# Patient Record
Sex: FEMALE | Race: WHITE | NOT HISPANIC OR LATINO | Employment: FULL TIME | URBAN - METROPOLITAN AREA
[De-identification: names, ages, dates, MRNs, and addresses within clinical notes are randomized per-mention and may not be internally consistent; named-entity substitution may affect disease eponyms.]

---

## 2021-06-28 ENCOUNTER — OFFICE VISIT (OUTPATIENT)
Dept: FAMILY MEDICINE CLINIC | Facility: CLINIC | Age: 36
End: 2021-06-28

## 2021-06-28 VITALS
DIASTOLIC BLOOD PRESSURE: 79 MMHG | HEIGHT: 65 IN | BODY MASS INDEX: 31.32 KG/M2 | HEART RATE: 108 BPM | OXYGEN SATURATION: 98 % | WEIGHT: 188 LBS | SYSTOLIC BLOOD PRESSURE: 112 MMHG

## 2021-06-28 DIAGNOSIS — Z00.00 HEALTH MAINTENANCE EXAMINATION: Primary | ICD-10-CM

## 2021-06-28 DIAGNOSIS — F41.9 ANXIETY: ICD-10-CM

## 2021-06-28 PROCEDURE — 99385 PREV VISIT NEW AGE 18-39: CPT | Performed by: NURSE PRACTITIONER

## 2021-06-28 RX ORDER — SERTRALINE HYDROCHLORIDE 100 MG/1
100 TABLET, FILM COATED ORAL DAILY
COMMUNITY
End: 2021-06-28 | Stop reason: SDUPTHER

## 2021-06-28 RX ORDER — SERTRALINE HYDROCHLORIDE 100 MG/1
100 TABLET, FILM COATED ORAL DAILY
Qty: 90 TABLET | Refills: 3 | Status: SHIPPED | OUTPATIENT
Start: 2021-06-28 | End: 2022-07-04

## 2021-06-28 NOTE — PATIENT INSTRUCTIONS

## 2021-06-28 NOTE — PROGRESS NOTES
"Subjective   Sarita Plata is a 35 y.o. female.       HPI   Pt. is new to our office today.   Previous medical records requested.    Medical/social/family hx reviewed.   She is here for a routine physical exam.    Currently taking sertraline 100 mg daily for anxiety/depression.  She is currently seeing a therapist.  She feels moods and anxiety are stable.  Denies any SI or HI.    Last pap was 2-3 years ago and normal.    Remembers she was told she was in a \"low-risk category\" and wouldn't need another one for 5 years.   She had labs recently with her previous provider.      The following portions of the patient's history were reviewed and updated as appropriate: allergies, current medications, past family history, past medical history, past social history, past surgical history and problem list.    Review of Systems   Constitutional: Negative for activity change, appetite change, chills, diaphoresis, fatigue, fever, unexpected weight gain and unexpected weight loss.   HENT: Negative for congestion, dental problem, ear discharge, ear pain, postnasal drip, rhinorrhea, sinus pressure, sneezing, sore throat, swollen glands and trouble swallowing.    Eyes: Negative for blurred vision, photophobia and visual disturbance.   Respiratory: Negative for cough, chest tightness, shortness of breath and wheezing.    Cardiovascular: Negative for chest pain, palpitations and leg swelling.   Gastrointestinal: Negative for abdominal distention, abdominal pain, blood in stool, constipation, diarrhea, nausea, vomiting and indigestion.   Endocrine: Negative for cold intolerance, heat intolerance, polydipsia, polyphagia and polyuria.   Genitourinary: Negative for decreased urine volume, difficulty urinating, dysuria, flank pain, frequency, hematuria, menstrual problem and urgency.   Musculoskeletal: Negative for arthralgias, back pain and myalgias.   Skin: Negative for rash.   Neurological: Negative for dizziness, weakness, " light-headedness, headache and confusion.   Psychiatric/Behavioral: Negative for depressed mood. The patient is not nervous/anxious.        Objective   Physical Exam  Vitals reviewed.   Constitutional:       General: She is not in acute distress.     Appearance: Normal appearance.   HENT:      Head: Normocephalic and atraumatic.      Right Ear: External ear normal.      Left Ear: External ear normal.      Nose: Nose normal.      Mouth/Throat:      Mouth: Mucous membranes are moist.      Pharynx: Oropharynx is clear.   Eyes:      Conjunctiva/sclera: Conjunctivae normal.   Cardiovascular:      Rate and Rhythm: Normal rate and regular rhythm.      Pulses: Normal pulses.      Heart sounds: Normal heart sounds. No murmur heard.     Pulmonary:      Effort: Pulmonary effort is normal. No respiratory distress.      Breath sounds: Normal breath sounds. No wheezing.   Chest:      Chest wall: No tenderness.   Abdominal:      General: Bowel sounds are normal. There is no distension.      Palpations: Abdomen is soft.      Tenderness: There is no abdominal tenderness. There is no right CVA tenderness or left CVA tenderness.   Musculoskeletal:      Cervical back: Normal range of motion and neck supple. No tenderness.      Right lower leg: No edema.      Left lower leg: No edema.   Skin:     General: Skin is warm and dry.      Findings: No erythema.   Neurological:      General: No focal deficit present.      Mental Status: She is alert and oriented to person, place, and time.   Psychiatric:         Mood and Affect: Mood normal.           Assessment/Plan   Diagnoses and all orders for this visit:    1. Health maintenance examination (Primary)  Comments:  Medical/social/family hx reviewed.   Previous medical records requested.   Pap UTD per pt.   Labs UTD per pt.    Immunizations reviewed.     2. Anxiety  Comments:  Stable.   Cont. current medication.   Refills sent.   Orders:  -     sertraline (ZOLOFT) 100 MG tablet; Take 1 tablet  by mouth Daily.  Dispense: 90 tablet; Refill: 3    Other orders  -     Cancel: Comprehensive metabolic panel; Future  -     Cancel: Lipid panel; Future                Overall well-being, sleep habits and weight goals, can be improved by maintaining healthy diet and regular physical activity. Recommended regular dental care, vision care, consistent seatbelt use, and skin protection.  Handout in AVS.

## 2021-07-09 ENCOUNTER — OFFICE VISIT (OUTPATIENT)
Dept: FAMILY MEDICINE CLINIC | Facility: CLINIC | Age: 36
End: 2021-07-09

## 2021-07-09 VITALS
TEMPERATURE: 97.1 F | HEART RATE: 87 BPM | DIASTOLIC BLOOD PRESSURE: 86 MMHG | SYSTOLIC BLOOD PRESSURE: 122 MMHG | WEIGHT: 187 LBS | BODY MASS INDEX: 31.12 KG/M2 | OXYGEN SATURATION: 99 %

## 2021-07-09 DIAGNOSIS — R00.2 PALPITATIONS: Primary | ICD-10-CM

## 2021-07-09 DIAGNOSIS — R07.89 CHEST TIGHTNESS: ICD-10-CM

## 2021-07-09 PROCEDURE — 93000 ELECTROCARDIOGRAM COMPLETE: CPT | Performed by: PHYSICIAN ASSISTANT

## 2021-07-09 PROCEDURE — 99213 OFFICE O/P EST LOW 20 MIN: CPT | Performed by: PHYSICIAN ASSISTANT

## 2021-07-09 NOTE — PATIENT INSTRUCTIONS
Please get echocardiogram done at your convenience.  Someone should call you to schedule this.  Please also get chest xray done at your convenience.  You do not need an appointment.  Go to the ER for any worsening symptoms.

## 2021-07-09 NOTE — PROGRESS NOTES
Subjective   Sarita Plata is a 36 y.o. female.     Pt got her second covid shot on Tuesday. She did get some chills and fatigue after vaccine. She was fine until last night when she felt like her chest felt a little tight and then started noticing her heart beat being irregular.  She continued to feel that way this morning and was worried. She denies any fevers or loss of appetite. She continues to have tight feeling in chest and heart rate issues occur more with being active but comes and goes.  No shortness of breath different than her usual that she attributes to being out of shape.       The following portions of the patient's history were reviewed and updated as appropriate: allergies, current medications, past family history, past medical history, past social history, past surgical history and problem list.  Past Medical History:   Diagnosis Date   • Anxiety and depression      Past Surgical History:   Procedure Laterality Date   • WISDOM TOOTH EXTRACTION       Family History   Problem Relation Age of Onset   • Hypertension Father    • Depression Mother    • Breast cancer Maternal Grandmother      Social History     Socioeconomic History   • Marital status: Single     Spouse name: Not on file   • Number of children: Not on file   • Years of education: Not on file   • Highest education level: Not on file   Tobacco Use   • Smoking status: Never Smoker   • Smokeless tobacco: Never Used   Substance and Sexual Activity   • Alcohol use: Not Currently   • Drug use: Never         Current Outpatient Medications:   •  sertraline (ZOLOFT) 100 MG tablet, Take 1 tablet by mouth Daily., Disp: 90 tablet, Rfl: 3    Review of Systems   Constitutional: Negative for activity change, appetite change, chills, diaphoresis, fatigue, fever, unexpected weight gain and unexpected weight loss.   Respiratory: Positive for chest tightness. Negative for cough, shortness of breath and wheezing.    Cardiovascular: Positive for palpitations.  Negative for chest pain and leg swelling.   Gastrointestinal: Negative for abdominal pain, constipation, diarrhea, nausea and vomiting.   Musculoskeletal: Negative for myalgias.   Neurological: Negative for weakness, light-headedness and headache.     /86 (BP Location: Left arm, Patient Position: Sitting, Cuff Size: Adult)   Pulse 87   Temp 97.1 °F (36.2 °C) (Skin)   Wt 84.8 kg (187 lb)   SpO2 99%   BMI 31.12 kg/m²       Objective   Physical Exam  Vitals and nursing note reviewed.   Constitutional:       Appearance: Normal appearance.   Cardiovascular:      Rate and Rhythm: Normal rate and regular rhythm.      Heart sounds: Normal heart sounds.   Pulmonary:      Effort: Pulmonary effort is normal.      Breath sounds: Normal breath sounds.   Musculoskeletal:      Right lower leg: No edema.      Left lower leg: No edema.   Skin:     General: Skin is warm and dry.   Neurological:      Mental Status: She is alert and oriented to person, place, and time.   Psychiatric:         Mood and Affect: Mood normal.         Behavior: Behavior normal.         Thought Content: Thought content normal.           ECG 12 Lead    Date/Time: 7/9/2021 1:38 PM  Performed by: Kayy Linton PA  Authorized by: Kayy Linton PA   Comparison: not compared with previous ECG   Rhythm: sinus rhythm  Rate: normal  Conduction: conduction normal  ST Segments: ST segments normal  T Waves: T waves normal  QRS axis: normal  Other: no other findings    Clinical impression: normal ECG             Assessment/Plan   Diagnoses and all orders for this visit:    1. Palpitations (Primary)  -     Adult Transthoracic Echo Complete W/ Cont if Necessary Per Protocol; Future  -     XR Chest 2 View; Future  -     ECG 12 Lead    2. Chest tightness  -     XR Chest 2 View; Future  -     ECG 12 Lead      EKG normal today.  Discussed that likely nothing concerning but will check chest xray and also ordered echocardiogram.  Pt to go to ER for any severe  symptoms.

## 2021-07-12 ENCOUNTER — TELEPHONE (OUTPATIENT)
Dept: FAMILY MEDICINE CLINIC | Facility: CLINIC | Age: 36
End: 2021-07-12

## 2021-07-12 NOTE — TELEPHONE ENCOUNTER
Caller: Sarita Plata    Relationship: Self    Best call back number: [phone]    Caller requesting test results: PATIENT     What test was performed: CHEST XRAY    When was the test performed: 7/9/21    Where was the test performed: [facility]

## 2021-07-13 NOTE — TELEPHONE ENCOUNTER
Patient is calling for the chest xray results that you ordered on 7/9. It is scanned in the media tab!

## 2021-07-15 ENCOUNTER — HOSPITAL ENCOUNTER (OUTPATIENT)
Dept: CARDIOLOGY | Facility: HOSPITAL | Age: 36
Discharge: HOME | End: 2021-07-15
Admitting: PHYSICIAN ASSISTANT

## 2021-07-15 VITALS
DIASTOLIC BLOOD PRESSURE: 82 MMHG | BODY MASS INDEX: 31.16 KG/M2 | WEIGHT: 187 LBS | HEIGHT: 65 IN | SYSTOLIC BLOOD PRESSURE: 121 MMHG

## 2021-07-15 DIAGNOSIS — R00.2 PALPITATIONS: ICD-10-CM

## 2021-07-15 LAB
BH CV ECHO MEAS - % IVS THICK: 47.5 %
BH CV ECHO MEAS - % LVPW THICK: 44.9 %
BH CV ECHO MEAS - ACS: 1.7 CM
BH CV ECHO MEAS - AO MAX PG (FULL): 2.3 MMHG
BH CV ECHO MEAS - AO MAX PG: 9 MMHG
BH CV ECHO MEAS - AO MEAN PG (FULL): 1.2 MMHG
BH CV ECHO MEAS - AO MEAN PG: 5 MMHG
BH CV ECHO MEAS - AO ROOT AREA (BSA CORRECTED): 1.2
BH CV ECHO MEAS - AO ROOT AREA: 4.4 CM^2
BH CV ECHO MEAS - AO ROOT DIAM: 2.4 CM
BH CV ECHO MEAS - AO V2 MAX: 150.3 CM/SEC
BH CV ECHO MEAS - AO V2 MEAN: 106.8 CM/SEC
BH CV ECHO MEAS - AO V2 VTI: 31.4 CM
BH CV ECHO MEAS - AVA(I,A): 2.8 CM^2
BH CV ECHO MEAS - AVA(I,D): 2.8 CM^2
BH CV ECHO MEAS - AVA(V,A): 2.8 CM^2
BH CV ECHO MEAS - AVA(V,D): 2.8 CM^2
BH CV ECHO MEAS - BSA(HAYCOCK): 2 M^2
BH CV ECHO MEAS - BSA: 1.9 M^2
BH CV ECHO MEAS - BZI_BMI: 31.1 KILOGRAMS/M^2
BH CV ECHO MEAS - BZI_METRIC_HEIGHT: 165.1 CM
BH CV ECHO MEAS - BZI_METRIC_WEIGHT: 84.8 KG
BH CV ECHO MEAS - EDV(CUBED): 116.6 ML
BH CV ECHO MEAS - EDV(MOD-SP4): 68.2 ML
BH CV ECHO MEAS - EDV(TEICH): 112 ML
BH CV ECHO MEAS - EF(CUBED): 73.3 %
BH CV ECHO MEAS - EF(MOD-BP): 68 %
BH CV ECHO MEAS - EF(MOD-SP4): 67.6 %
BH CV ECHO MEAS - EF(TEICH): 64.9 %
BH CV ECHO MEAS - ESV(CUBED): 31.2 ML
BH CV ECHO MEAS - ESV(MOD-SP4): 22.1 ML
BH CV ECHO MEAS - ESV(TEICH): 39.3 ML
BH CV ECHO MEAS - FS: 35.6 %
BH CV ECHO MEAS - IVS/LVPW: 0.99
BH CV ECHO MEAS - IVSD: 0.85 CM
BH CV ECHO MEAS - IVSS: 1.3 CM
BH CV ECHO MEAS - LA DIMENSION(2D): 2.9 CM
BH CV ECHO MEAS - LV DIASTOLIC VOL/BSA (35-75): 35.5 ML/M^2
BH CV ECHO MEAS - LV MASS(C)D: 141.7 GRAMS
BH CV ECHO MEAS - LV MASS(C)DI: 73.7 GRAMS/M^2
BH CV ECHO MEAS - LV MASS(C)S: 123.8 GRAMS
BH CV ECHO MEAS - LV MASS(C)SI: 64.4 GRAMS/M^2
BH CV ECHO MEAS - LV MAX PG: 6.7 MMHG
BH CV ECHO MEAS - LV MEAN PG: 3.8 MMHG
BH CV ECHO MEAS - LV SYSTOLIC VOL/BSA (12-30): 11.5 ML/M^2
BH CV ECHO MEAS - LV V1 MAX: 129.5 CM/SEC
BH CV ECHO MEAS - LV V1 MEAN: 92.9 CM/SEC
BH CV ECHO MEAS - LV V1 VTI: 27 CM
BH CV ECHO MEAS - LVIDD: 4.9 CM
BH CV ECHO MEAS - LVIDS: 3.1 CM
BH CV ECHO MEAS - LVOT AREA: 3.3 CM^2
BH CV ECHO MEAS - LVOT DIAM: 2 CM
BH CV ECHO MEAS - LVPWD: 0.86 CM
BH CV ECHO MEAS - LVPWS: 1.2 CM
BH CV ECHO MEAS - MV A MAX VEL: 77.2 CM/SEC
BH CV ECHO MEAS - MV DEC SLOPE: 423.3 CM/SEC^2
BH CV ECHO MEAS - MV DEC TIME: 0.21 SEC
BH CV ECHO MEAS - MV E MAX VEL: 87.4 CM/SEC
BH CV ECHO MEAS - MV E/A: 1.1
BH CV ECHO MEAS - MV MAX PG: 3.2 MMHG
BH CV ECHO MEAS - MV MEAN PG: 1.4 MMHG
BH CV ECHO MEAS - MV V2 MAX: 90.1 CM/SEC
BH CV ECHO MEAS - MV V2 MEAN: 55.1 CM/SEC
BH CV ECHO MEAS - MV V2 VTI: 18.5 CM
BH CV ECHO MEAS - MVA(VTI): 4.8 CM^2
BH CV ECHO MEAS - PA ACC TIME: 0.11 SEC
BH CV ECHO MEAS - PA MAX PG (FULL): 1.7 MMHG
BH CV ECHO MEAS - PA MAX PG: 4.6 MMHG
BH CV ECHO MEAS - PA MEAN PG (FULL): 0.87 MMHG
BH CV ECHO MEAS - PA MEAN PG: 2.3 MMHG
BH CV ECHO MEAS - PA PR(ACCEL): 29.2 MMHG
BH CV ECHO MEAS - PA V2 MAX: 106.8 CM/SEC
BH CV ECHO MEAS - PA V2 MEAN: 72.4 CM/SEC
BH CV ECHO MEAS - PA V2 VTI: 22.8 CM
BH CV ECHO MEAS - PULM A REVS DUR: 0.09 SEC
BH CV ECHO MEAS - PULM A REVS VEL: 29.8 CM/SEC
BH CV ECHO MEAS - PULM DIAS VEL: 34.6 CM/SEC
BH CV ECHO MEAS - PULM S/D: 1.6
BH CV ECHO MEAS - PULM SYS VEL: 54.8 CM/SEC
BH CV ECHO MEAS - RAP SYSTOLE: 3 MMHG
BH CV ECHO MEAS - RV MAX PG: 2.9 MMHG
BH CV ECHO MEAS - RV MEAN PG: 1.5 MMHG
BH CV ECHO MEAS - RV V1 MAX: 84.5 CM/SEC
BH CV ECHO MEAS - RV V1 MEAN: 57.4 CM/SEC
BH CV ECHO MEAS - RV V1 VTI: 17.9 CM
BH CV ECHO MEAS - RVDD: 2.1 CM
BH CV ECHO MEAS - RVSP: 23.3 MMHG
BH CV ECHO MEAS - SI(AO): 71.4 ML/M^2
BH CV ECHO MEAS - SI(CUBED): 44.4 ML/M^2
BH CV ECHO MEAS - SI(LVOT): 46.1 ML/M^2
BH CV ECHO MEAS - SI(MOD-SP4): 24 ML/M^2
BH CV ECHO MEAS - SI(TEICH): 37.8 ML/M^2
BH CV ECHO MEAS - SV(AO): 137.2 ML
BH CV ECHO MEAS - SV(CUBED): 85.4 ML
BH CV ECHO MEAS - SV(LVOT): 88.7 ML
BH CV ECHO MEAS - SV(MOD-SP4): 46.1 ML
BH CV ECHO MEAS - SV(TEICH): 72.7 ML
BH CV ECHO MEAS - TR MAX VEL: 224.7 CM/SEC
MAXIMAL PREDICTED HEART RATE: 186 BPM
STRESS TARGET HR: 158 BPM

## 2021-07-15 PROCEDURE — 93306 TTE W/DOPPLER COMPLETE: CPT

## 2021-07-15 PROCEDURE — 93306 TTE W/DOPPLER COMPLETE: CPT | Performed by: INTERNAL MEDICINE

## 2021-07-16 ENCOUNTER — LAB (OUTPATIENT)
Dept: LAB | Facility: HOSPITAL | Age: 36
End: 2021-07-16

## 2021-07-16 DIAGNOSIS — R06.02 SHORTNESS OF BREATH AFTER COVID-19 VACCINATION: ICD-10-CM

## 2021-07-16 DIAGNOSIS — T50.B95A SHORTNESS OF BREATH AFTER COVID-19 VACCINATION: ICD-10-CM

## 2021-07-16 DIAGNOSIS — I51.7 CONCENTRIC LEFT VENTRICULAR HYPERTROPHY: ICD-10-CM

## 2021-07-16 LAB
ALBUMIN SERPL-MCNC: 4.4 G/DL (ref 3.5–5.2)
ALBUMIN/GLOB SERPL: 1.8 G/DL
ALP SERPL-CCNC: 92 U/L (ref 39–117)
ALT SERPL W P-5'-P-CCNC: 5 U/L (ref 1–33)
ANION GAP SERPL CALCULATED.3IONS-SCNC: 8 MMOL/L (ref 5–15)
AST SERPL-CCNC: 8 U/L (ref 1–32)
BASOPHILS # BLD AUTO: 0.1 10*3/MM3 (ref 0–0.2)
BASOPHILS NFR BLD AUTO: 0.8 % (ref 0–1.5)
BILIRUB SERPL-MCNC: 0.2 MG/DL (ref 0–1.2)
BUN SERPL-MCNC: 7 MG/DL (ref 6–20)
BUN/CREAT SERPL: 10.9 (ref 7–25)
CALCIUM SPEC-SCNC: 9 MG/DL (ref 8.6–10.5)
CHLORIDE SERPL-SCNC: 102 MMOL/L (ref 98–107)
CO2 SERPL-SCNC: 27 MMOL/L (ref 22–29)
CREAT SERPL-MCNC: 0.64 MG/DL (ref 0.57–1)
CRP SERPL-MCNC: <0.3 MG/DL (ref 0–0.5)
DEPRECATED RDW RBC AUTO: 38.5 FL (ref 37–54)
EOSINOPHIL # BLD AUTO: 0 10*3/MM3 (ref 0–0.4)
EOSINOPHIL NFR BLD AUTO: 0.5 % (ref 0.3–6.2)
ERYTHROCYTE [DISTWIDTH] IN BLOOD BY AUTOMATED COUNT: 13.8 % (ref 12.3–15.4)
GFR SERPL CREATININE-BSD FRML MDRD: 106 ML/MIN/1.73
GLOBULIN UR ELPH-MCNC: 2.5 GM/DL
GLUCOSE SERPL-MCNC: 86 MG/DL (ref 65–99)
HCT VFR BLD AUTO: 36.2 % (ref 34–46.6)
HGB BLD-MCNC: 12.2 G/DL (ref 12–15.9)
LYMPHOCYTES # BLD AUTO: 2.4 10*3/MM3 (ref 0.7–3.1)
LYMPHOCYTES NFR BLD AUTO: 27.3 % (ref 19.6–45.3)
MCH RBC QN AUTO: 27.3 PG (ref 26.6–33)
MCHC RBC AUTO-ENTMCNC: 33.7 G/DL (ref 31.5–35.7)
MCV RBC AUTO: 81 FL (ref 79–97)
MONOCYTES # BLD AUTO: 0.7 10*3/MM3 (ref 0.1–0.9)
MONOCYTES NFR BLD AUTO: 7.4 % (ref 5–12)
NEUTROPHILS NFR BLD AUTO: 5.7 10*3/MM3 (ref 1.7–7)
NEUTROPHILS NFR BLD AUTO: 64 % (ref 42.7–76)
NRBC BLD AUTO-RTO: 0 /100 WBC (ref 0–0.2)
PLATELET # BLD AUTO: 406 10*3/MM3 (ref 140–450)
PMV BLD AUTO: 6.7 FL (ref 6–12)
POTASSIUM SERPL-SCNC: 3.5 MMOL/L (ref 3.5–5.2)
PROT SERPL-MCNC: 6.9 G/DL (ref 6–8.5)
RBC # BLD AUTO: 4.47 10*6/MM3 (ref 3.77–5.28)
SODIUM SERPL-SCNC: 137 MMOL/L (ref 136–145)
TROPONIN T SERPL-MCNC: <0.01 NG/ML (ref 0–0.03)
TSH SERPL DL<=0.05 MIU/L-ACNC: 0.82 UIU/ML (ref 0.27–4.2)
WBC # BLD AUTO: 8.9 10*3/MM3 (ref 3.4–10.8)

## 2021-07-16 PROCEDURE — 86140 C-REACTIVE PROTEIN: CPT

## 2021-07-16 PROCEDURE — 84443 ASSAY THYROID STIM HORMONE: CPT

## 2021-07-16 PROCEDURE — 36415 COLL VENOUS BLD VENIPUNCTURE: CPT

## 2021-07-16 PROCEDURE — 84484 ASSAY OF TROPONIN QUANT: CPT

## 2021-07-16 PROCEDURE — 80053 COMPREHEN METABOLIC PANEL: CPT

## 2021-07-16 PROCEDURE — 85025 COMPLETE CBC W/AUTO DIFF WBC: CPT

## 2021-07-16 NOTE — PROGRESS NOTES
Results reviewed.  Please call patient.All blood testing is completely normal which suggests that there is no myocarditis or other more serious condition.  Continue current plan of care, and follow-up as we discussed at the visit.

## 2021-07-19 ENCOUNTER — OFFICE VISIT (OUTPATIENT)
Dept: FAMILY MEDICINE CLINIC | Facility: CLINIC | Age: 36
End: 2021-07-19

## 2021-07-19 VITALS
OXYGEN SATURATION: 98 % | HEIGHT: 65 IN | WEIGHT: 183 LBS | SYSTOLIC BLOOD PRESSURE: 119 MMHG | DIASTOLIC BLOOD PRESSURE: 81 MMHG | HEART RATE: 105 BPM | BODY MASS INDEX: 30.49 KG/M2

## 2021-07-19 DIAGNOSIS — F41.9 ANXIETY: ICD-10-CM

## 2021-07-19 DIAGNOSIS — R00.2 HEART PALPITATIONS: Primary | ICD-10-CM

## 2021-07-19 PROCEDURE — 99214 OFFICE O/P EST MOD 30 MIN: CPT | Performed by: NURSE PRACTITIONER

## 2021-07-19 RX ORDER — ALPRAZOLAM 0.5 MG/1
0.5 TABLET ORAL 3 TIMES DAILY PRN
Qty: 30 TABLET | Refills: 0 | Status: SHIPPED | OUTPATIENT
Start: 2021-07-19 | End: 2021-07-26 | Stop reason: SDUPTHER

## 2021-07-19 NOTE — PROGRESS NOTES
Subjective   Sarita Plata is a 36 y.o. female.       HPI   Pt. is here today for follow up on heart palpitations.   Symptoms started around 7/6 following her second COVID vaccine.    She was evaluated in our office on 7/9; EKG, CXR and labs were normal.  Was sent for Echo, which showed mild left ventricular wall thickness consistent with mild concentric hypertrophy.  She will be seeing cardiology next week for further evaluation.    [place] [facility] [place] continued palpitations. It was thought anxiety may be playing a role so Zoloft (which she had been on prior for anxiety) was restarted.  She is currently taking 50 mg daily for the past 4 days.  She admits she is not sleeping or eating well due to stress/worry.  Denies any SI or HI.  She reports heart feels as if it is racing continually and she will have intermittent pains in her chest.  Denies any trouble with breathing; headaches; vision changes.        The following portions of the patient's history were reviewed and updated as appropriate: allergies, current medications, past family history, past medical history, past social history, past surgical history and problem list.    Review of Systems   Constitutional: Positive for appetite change. Negative for activity change, chills, diaphoresis, fatigue, fever, unexpected weight gain and unexpected weight loss.   Eyes: Negative for visual disturbance.   Respiratory: Negative for cough, chest tightness, shortness of breath and wheezing.    Cardiovascular: Positive for chest pain and palpitations. Negative for leg swelling.   Gastrointestinal: Negative for abdominal pain, blood in stool, constipation, diarrhea, nausea, vomiting and indigestion.   Endocrine: Negative for cold intolerance and heat intolerance.   Genitourinary: Negative for decreased urine volume, difficulty urinating, dysuria, flank pain, frequency and urgency.   Musculoskeletal: Negative for arthralgias, back pain and myalgias.   Neurological:  Negative for dizziness, syncope, weakness, light-headedness, headache and confusion.   Psychiatric/Behavioral: Positive for sleep disturbance and stress. Negative for self-injury, suicidal ideas and depressed mood. The patient is nervous/anxious.        Objective   Physical Exam  Vitals reviewed.   Constitutional:       General: She is not in acute distress.     Appearance: Normal appearance.   Cardiovascular:      Rate and Rhythm: Normal rate and regular rhythm.      Pulses: Normal pulses.      Heart sounds: Normal heart sounds. No murmur heard.     Pulmonary:      Effort: Pulmonary effort is normal. No respiratory distress.      Breath sounds: Normal breath sounds. No wheezing or rhonchi.   Chest:      Chest wall: No tenderness.   Abdominal:      Tenderness: There is no right CVA tenderness or left CVA tenderness.   Musculoskeletal:      Cervical back: Normal range of motion and neck supple. No tenderness.      Right lower leg: No edema.      Left lower leg: No edema.   Skin:     General: Skin is warm and dry.      Findings: No erythema.   Neurological:      General: No focal deficit present.      Mental Status: She is alert and oriented to person, place, and time.   Psychiatric:         Mood and Affect: Mood is anxious.         Speech: Speech normal.         Behavior: Behavior is cooperative.         Thought Content: Thought content does not include homicidal or suicidal ideation.      Comments: Fidgets with hands and feet during the interview/exam today.            Assessment/Plan   Diagnoses and all orders for this visit:    1. Heart palpitations (Primary)  Comments:  EKG, CXR and blood work from 7/9 reviewed.    Echo from 7/15 reviewed.   Keep follow up with cardiology next week.   Advised to go to ER for wrosening.     2. Anxiety  Comments:  Cont. sertraline.   Given alprazolam PRN.   Orders:  -     ALPRAZolam (XANAX) 0.5 MG tablet; Take 1 tablet by mouth 3 (Three) Times a Day As Needed for Anxiety.   Dispense: 30 tablet; Refill: 0    Other orders  -     Cancel: Holter Monitor - 24 Hour; Future

## 2021-07-26 ENCOUNTER — TELEPHONE (OUTPATIENT)
Dept: FAMILY MEDICINE CLINIC | Facility: CLINIC | Age: 36
End: 2021-07-26

## 2021-07-26 DIAGNOSIS — F41.9 ANXIETY: ICD-10-CM

## 2021-07-26 RX ORDER — ALPRAZOLAM 0.5 MG/1
0.5 TABLET ORAL 3 TIMES DAILY PRN
Qty: 30 TABLET | Refills: 0 | Status: SHIPPED | OUTPATIENT
Start: 2021-07-26 | End: 2021-08-18 | Stop reason: SDUPTHER

## 2021-07-26 NOTE — TELEPHONE ENCOUNTER
Caller: Sarita Plata    Relationship: Self    Best call back number: [phone]    What was the call regarding:     PATIENT ONLY HAS TWO MORE DAYS LEFT OF ALPRAZOLAM. SHE WAS GIVEN A 10 DAY SUPPLY AND SHE WOULD LIKE ANOTHER 10 DAY SUPPLY.    PATIENT HAS BEEN SPLITTING THE ZOLOFT AT 1/2 DOSE. IT WAS TOO STRONG  MG. SHE HAS SPLIT IT FOR 1 WEEK. HOW LONG DOES ROSSY OZUNA THINK IS SAFE BEFORE BUMPING IT BACK UP?      Do you require a callback:YES    CONFIRMED PHARMACY:  [facility]/pharmacy #[id number] - [place], [place] [address] [phone] [facility] [phone]   [phone]

## 2021-07-26 NOTE — TELEPHONE ENCOUNTER
Refill on alprazolam sent.      I would have her increase the Zoloft to 100 mg daily after 2 weeks of taking the 50 mg (1/2 tab) dose.

## 2021-07-28 ENCOUNTER — TELEPHONE (OUTPATIENT)
Dept: FAMILY MEDICINE CLINIC | Facility: CLINIC | Age: 36
End: 2021-07-28

## 2021-07-28 DIAGNOSIS — G47.00 INSOMNIA, UNSPECIFIED TYPE: Primary | ICD-10-CM

## 2021-07-28 RX ORDER — TRAZODONE HYDROCHLORIDE 50 MG/1
50 TABLET ORAL NIGHTLY
Qty: 30 TABLET | Refills: 3 | Status: SHIPPED | OUTPATIENT
Start: 2021-07-28 | End: 2021-08-02

## 2021-07-28 NOTE — TELEPHONE ENCOUNTER
I will send in Trazodone for her to take at bedtime to help with sleep.  She would take it about 30-45 minutes prior to bedtime.

## 2021-07-28 NOTE — TELEPHONE ENCOUNTER
PATIENT IS CALLING IN SHE STATES THAT SHE IS STILL NOT SLEEPING. SHE STATES THAT THE ALPRAZOLAM HAS HELPED WITH THE ANXIETY BUT SHE IS STILL NOT SLEEPING.    WANTS TO KNOW IF SHE CAN GET SOMETHING FOR TO HELP HER SLEEP, SHE IS NOT SURE IF WITH THIS MEDICATION SHE CAN HAVE A SLEEP AID OR NOT OR IF THERE ARE CERTAIN SPACES OF TIME THAT THEY WOULD NEED TO BE TAKEN.    PLEASE ADVISE    CALLBACK NUMBER IS  [phone]

## 2021-07-30 ENCOUNTER — OFFICE VISIT (OUTPATIENT)
Dept: CARDIOLOGY | Facility: CLINIC | Age: 36
End: 2021-07-30

## 2021-07-30 VITALS
SYSTOLIC BLOOD PRESSURE: 110 MMHG | HEART RATE: 81 BPM | WEIGHT: 175 LBS | HEIGHT: 65 IN | OXYGEN SATURATION: 97 % | BODY MASS INDEX: 29.16 KG/M2 | DIASTOLIC BLOOD PRESSURE: 80 MMHG

## 2021-07-30 DIAGNOSIS — R00.2 PALPITATIONS: Primary | ICD-10-CM

## 2021-07-30 PROCEDURE — 93000 ELECTROCARDIOGRAM COMPLETE: CPT | Performed by: INTERNAL MEDICINE

## 2021-07-30 PROCEDURE — 99214 OFFICE O/P EST MOD 30 MIN: CPT | Performed by: INTERNAL MEDICINE

## 2021-07-30 RX ORDER — IBUPROFEN 200 MG
200 TABLET ORAL EVERY 6 HOURS PRN
COMMUNITY

## 2021-07-30 NOTE — PROGRESS NOTES
Reason for consultation: Palpitations / Shortness of breath      Referring physician: Luisana Severino APRN HPI.     Sarita Plata is a 36 year old female who is referred for consultation regarding palpitations and Shortness of breath. She states she had her Covid vaccine and a few days later she started to feel flutters and shortness of air.   The symptoms started 3 days after getting the Covid vaccination on July 12.  She subsequently has undergone echo cardiographic exam, chest x-ray and routine serology-of which are unrevealing.    She admits her complaint of palpitations has decreased in frequency from multiple episodes a day down to only 1 or 2/week at this point.  She recognizes that her complaints of palpitations are associated with complaint of dyspnea at times, though she also admits these episodes are very brief and transient and not related to prolonged dysrhythmias, dizziness, lightheadedness or near syncope.    Sarita recognizes that there is a definite anxiety component to these complaints and that she is on multiple neuroleptic agents including sertraline, trazodone and Xanax which have improved her symptoms.  She is also aware that she had recently not filled her prescription for her neuroleptic agents which has a temporal relationship to the onset of the symptoms.  She has had no near syncopal or any luis syncopal episodes.    Ms. Plata denies any prior history of congenital heart disease, ischemic coronary events, cardiomyopathy, valvular history or sustained dysrhythmias in the past.  She has no additional past medical history aside from anxiety and depressive disorder.    She is a non-smoker nondrinker who is a modest caffeine user though she is discontinued all caffeine intake since her palpitations began initially on July 15, 2021.  Patient reports compliant with medications reviewed during visit.    The patient is single and lives with her mother.  She works full-time as a  [employer]  face.  She is a non-smoker nondrinker denies any prior recreational drug use.           Past Medical History:   Diagnosis Date   • Anxiety and depression        Past Surgical History:   Procedure Laterality Date   • WISDOM TOOTH EXTRACTION         Family History   Problem Relation Age of Onset   • Hypertension Father    • Depression Mother    • Breast cancer Maternal Grandmother        Social History     Socioeconomic History   • Marital status: Single     Spouse name: Not on file   • Number of children: Not on file   • Years of education: Not on file   • Highest education level: Not on file   Tobacco Use   • Smoking status: Never Smoker   • Smokeless tobacco: Never Used   Vaping Use   • Vaping Use: Never used   Substance and Sexual Activity   • Alcohol use: Not Currently     Comment: rare   • Drug use: Never   • Sexual activity: Defer         Review of Systems   General: denies fever, chills, anorexia, weight loss  Eyes: denies blurring, diplopia  Ear/Nose/Throat: denies ear pain, nosebleeds, hoarseness  Cardiovascular: See HPI  Respiratory: denies excessive sputum, hemoptysis, wheezing  Gastrointestinal: denies nausea, vomiting, change in bowel habits, abdominal pain  Genitourinary: denies dysuria and hematuria  Musculoskeletal: denies back pain, joint pain, joint swelling, muscle cramps, weakness  Skin: denies rashes, itching, suspicious lesions  Neurologic: denies focal neuro deficits  Psychiatric: denies depression, anxiety  Endocrine: denies cold intolerance, heat intolerance  Hematologic/Lymphatic: denies abnormal bruising, bleeding  Allergic/Immunologic: denies urticaria or persistent infections    Allergies: Latex    Current Meds:.  Current Outpatient Medications   Medication Sig Dispense Refill   • ALPRAZolam (XANAX) 0.5 MG tablet Take 1 tablet by mouth 3 (Three) Times a Day As Needed for Anxiety. 30 tablet 0   • ibuprofen (ADVIL,MOTRIN) 200 MG tablet Take 200 mg by mouth Every 6 (Six) Hours As Needed for  "Mild Pain .     • sertraline (ZOLOFT) 100 MG tablet Take 1 tablet by mouth Daily. 90 tablet 3   • traZODone (DESYREL) 50 MG tablet Take 1 tablet by mouth Every Night. 30 tablet 3     No current facility-administered medications for this visit.       Objective     VITAL SIGNS  /80   Pulse 81   Ht 165.1 cm (65\")   Wt 79.4 kg (175 lb)   LMP 07/05/2021   SpO2 97%   BMI 29.12 kg/m²      Physical Exam:  General:    Borderline  Obese, well developed,, in no acute distress.    Head:     normocephalic and atraumatic.    Eyes:    PERRL/EOM intact, conjunctiva and sclera clear with out nystagmus.    Neck:    no jvd or bruits  Chest Wall:    no deformities   Lungs:    clear bilaterally to auscultation with adequate global airflow   Heart:    non-displaced PMI; regular rate and rhythm, normal S1, S2 without murmurs, rubs, or gallops  Abdomen:  Soft, nontender without HSM  Msk:    no deformity; adequate R OM  Pulses:    pulses normal in all 4 extremities.    Extremities:    no clubbing, cyanosis, edema   Neurologic:    no focal sensory or motor deficits  Skin:    intact without lesions or rashes.    Psych:    alert and cooperative; normal mood and affect; normal attention span and concentration.             Results Review:      ECG 12 Lead    Date/Time: 7/30/2021 10:55 AM  Performed by: CLIFF Lazo MD  Authorized by: CLIFF Lazo MD   Comparison: not compared with previous ECG   Rhythm: sinus rhythm  QRS axis: normal    Clinical impression: normal ECG            ECHOCARDIOGRAM: Within normal months aside from borderline concentric LVH with good global contractility no valvular pathology or Doppler normalities    STRESS MYOVIEW: No previous    CARDIAC CATHETERIZATION: No previous    OTHER: Chest x-ray and routine serology entirely normal  Imaging Results (Last 24 Hours)     ** No results found for the last 24 hours. **                        Assessment/Plan     Palpitations with no additional evidence of " significant organic heart disease  -We will evaluate by 30-day event monitor  -Heart appears structurally normal by previously obtained echocardiogram  -Chest x-ray and routine serology unremarkable    Dyspnea-most likely multifactorial; related to level of deconditioning with very sedentary lifestyle reported    History of anxiety depressive disorder  -Maintained on Xanax, trazodone and sertraline        Ms. Rodriges was seen today with her mother present throughout the encounter.  Both were reassured that she shows little or no clinical evidence of significant organic heart disease despite complaints of palpitations and dyspnea.  She is advised undergo 30-day event monitoring followed by stress treadmill exam at her follow-up visit after completing the event monitor which I will review with her at her follow-up visit when she returns to undergo stress treadmill testing.  She is advised to limit caffeine intake and to work on regular progressive exercise while continuing current medications to improve sleep cycles.  RTC 6 weeks; further recommendations pending patient's course and findings on noninvasive studies.        CLIFF Lazo MD  7/30/2021 17:31 EDT    This report was generated using the Dragon voice recognition system.

## 2021-08-02 ENCOUNTER — TELEPHONE (OUTPATIENT)
Dept: FAMILY MEDICINE CLINIC | Facility: CLINIC | Age: 36
End: 2021-08-02

## 2021-08-02 ENCOUNTER — TELEPHONE (OUTPATIENT)
Dept: CARDIOLOGY | Facility: CLINIC | Age: 36
End: 2021-08-02

## 2021-08-02 DIAGNOSIS — G47.00 INSOMNIA, UNSPECIFIED TYPE: ICD-10-CM

## 2021-08-02 RX ORDER — TRAZODONE HYDROCHLORIDE 50 MG/1
100 TABLET ORAL NIGHTLY
Qty: 60 TABLET | Refills: 3 | Status: SHIPPED | OUTPATIENT
Start: 2021-08-02 | End: 2021-08-06

## 2021-08-02 NOTE — TELEPHONE ENCOUNTER
Message left for pt- we ill get more information if the monitor is worn for 30 days but if she feels she cannot do it- wear as long as she can and after she removes it return it as soon as possible so it can be downloaded.

## 2021-08-02 NOTE — TELEPHONE ENCOUNTER
Caller: Sarita Plata    Relationship: Self    Best call back number: [phone]    Which medication are you concerned about: traZODone (DESYREL) 50 MG tablet    Who prescribed you this medication: ROSSY OZUNA    What are your concerns: PATIENT STATES THAT THIS MEDICATION IS NOT HELPING.  SHE STATES SHE IS SLEEPING 2-3 HOURS PER NIGHT.  SHE WANTS TO KNOW IF THERE IS SOMETHING ELSE SHE CAN TAKE.  PLEASE CALL AND ADVISE        [facility]/pharmacy #[id number] - [place], [place] - [address] [phone] [facility] [phone]

## 2021-08-02 NOTE — TELEPHONE ENCOUNTER
Patient wants to know if she can do a 7 day monitor instead of 30 day event monitor. She says with her lifestyle and mental health, 30 days would be too hard for her.

## 2021-08-06 ENCOUNTER — OFFICE VISIT (OUTPATIENT)
Dept: FAMILY MEDICINE CLINIC | Facility: CLINIC | Age: 36
End: 2021-08-06

## 2021-08-06 VITALS
HEIGHT: 65 IN | WEIGHT: 175 LBS | HEART RATE: 107 BPM | SYSTOLIC BLOOD PRESSURE: 105 MMHG | BODY MASS INDEX: 29.16 KG/M2 | DIASTOLIC BLOOD PRESSURE: 71 MMHG | OXYGEN SATURATION: 97 %

## 2021-08-06 DIAGNOSIS — R07.9 INTERMITTENT CHEST PAIN: ICD-10-CM

## 2021-08-06 DIAGNOSIS — F41.9 ANXIETY: ICD-10-CM

## 2021-08-06 DIAGNOSIS — G47.00 INSOMNIA, UNSPECIFIED TYPE: ICD-10-CM

## 2021-08-06 DIAGNOSIS — R06.00 DYSPNEA, UNSPECIFIED TYPE: Primary | ICD-10-CM

## 2021-08-06 PROCEDURE — 99214 OFFICE O/P EST MOD 30 MIN: CPT | Performed by: NURSE PRACTITIONER

## 2021-08-06 RX ORDER — AMITRIPTYLINE HYDROCHLORIDE 25 MG/1
25 TABLET, FILM COATED ORAL NIGHTLY
Qty: 30 TABLET | Refills: 3 | Status: SHIPPED | OUTPATIENT
Start: 2021-08-06 | End: 2021-08-30

## 2021-08-06 NOTE — PROGRESS NOTES
Subjective   Sarita Plata is a 36 y.o. female.       HPI   Pt. is here today for evaluation for asthma.  She has been having intermittent chest pains for over a month.  She's been evaluated here and with cardiology more recently.  A 30 day even monitor is being scheduled for her; along with a stress test.  She relays today that her cardiologist really felt her heart was good and suggested the symptoms possibly being related to asthma.  She had asthma as an infant but has not had issues since this.  She does feel at times the chest pain does take her breath away and she has had episodes in which she has felt SOA with exertion and at rest.  She denies any cough or wheezes.  She is waking frequently at night but not due to cough.      Waking often; trouble falling back to sleep due. Maybe getting 4-4.5 hours. Taking trazodone 100 mg at bedtime; this has helped her go from 1-2 hours of sleep to about 4 but she continues to wake.  She is getting ready to return to work and is feeling afraid to even go to sleep because she knows she is going to wake up and probably not go back to sleep.   She is now up to 100 mg daily of sertraline for almost a week.  Has only been on this med for anxiety; on and off it over the years.      The following portions of the patient's history were reviewed and updated as appropriate: allergies, current medications, past family history, past medical history, past social history, past surgical history and problem list.    Review of Systems   Constitutional: Negative for activity change, appetite change, chills, diaphoresis, fatigue, fever, unexpected weight gain and unexpected weight loss.   HENT: Negative for congestion, ear discharge, ear pain, postnasal drip, rhinorrhea, sinus pressure, sneezing, sore throat, swollen glands and trouble swallowing.    Eyes: Negative for blurred vision, double vision and visual disturbance.   Respiratory: Positive for shortness of breath (intermittent).  Negative for cough, chest tightness and wheezing.    Cardiovascular: Positive for chest pain (intermittent). Negative for palpitations and leg swelling.   Gastrointestinal: Negative for diarrhea, nausea and vomiting.   Genitourinary: Negative for dysuria, flank pain, frequency and urgency.   Musculoskeletal: Negative for arthralgias, back pain and neck pain.   Skin: Negative for rash.   Neurological: Negative for dizziness, weakness, headache and confusion.   Psychiatric/Behavioral: Positive for sleep disturbance and stress. Negative for self-injury, suicidal ideas and depressed mood. The patient is nervous/anxious.        Objective   Physical Exam  Vitals reviewed.   Constitutional:       General: She is not in acute distress.     Appearance: Normal appearance.   HENT:      Head: Normocephalic and atraumatic.   Cardiovascular:      Rate and Rhythm: Normal rate and regular rhythm.      Pulses: Normal pulses.      Heart sounds: Normal heart sounds. No murmur heard.     Pulmonary:      Effort: Pulmonary effort is normal. No respiratory distress.      Breath sounds: Normal breath sounds. No wheezing or rhonchi.   Chest:      Chest wall: No tenderness.   Abdominal:      Tenderness: There is no right CVA tenderness or left CVA tenderness.   Musculoskeletal:      Cervical back: Normal range of motion and neck supple. No tenderness.      Right lower leg: No edema.      Left lower leg: No edema.   Skin:     General: Skin is warm and dry.      Findings: No erythema.   Neurological:      General: No focal deficit present.      Mental Status: She is alert and oriented to person, place, and time.   Psychiatric:         Mood and Affect: Mood is anxious.         Speech: Speech normal.         Behavior: Behavior normal. Behavior is cooperative.         Thought Content: Thought content normal. Thought content does not include homicidal or suicidal ideation.           Assessment/Plan   Diagnoses and all orders for this visit:    1.  Dyspnea, unspecified type (Primary)  Comments:  Reviewed CXR from 7/9; normal results.   PFT ordered.   Orders:  -     Full Pulmonary Function Test With Bronchodilator; Future    2. Intermittent chest pain  Comments:  Continue with plan for event monitor from cardiology.   PFT ordered.   Orders:  -     Full Pulmonary Function Test With Bronchodilator; Future    3. Insomnia, unspecified type  Comments:  Will switch from trazodone to amitriptyline 25 mg at bedtime.     Orders:  -     amitriptyline (ELAVIL) 25 MG tablet; Take 1 tablet by mouth Every Night.  Dispense: 30 tablet; Refill: 3    4. Anxiety  Comments:  Stay on 100 mg sertraline for a coulpe more weeks since this is a new dose.   Phone follow up in 2 weeks to re-eval.   Consider new med if not responding.

## 2021-08-09 ENCOUNTER — TELEPHONE (OUTPATIENT)
Dept: FAMILY MEDICINE CLINIC | Facility: CLINIC | Age: 36
End: 2021-08-09

## 2021-08-09 ENCOUNTER — HOSPITAL ENCOUNTER (OUTPATIENT)
Dept: RESPIRATORY THERAPY | Facility: HOSPITAL | Age: 36
Discharge: HOME | End: 2021-08-09
Admitting: INTERNAL MEDICINE

## 2021-08-09 DIAGNOSIS — R00.2 PALPITATIONS: Primary | ICD-10-CM

## 2021-08-09 DIAGNOSIS — R00.2 PALPITATIONS: ICD-10-CM

## 2021-08-09 PROCEDURE — 93270 REMOTE 30 DAY ECG REV/REPORT: CPT

## 2021-08-09 NOTE — TELEPHONE ENCOUNTER
Caller: Sarita Plata    Relationship to patient: Self    Best call back number: [phone]     Patient is needing:patient is calling to state the following medication is not helping with sleeping.  She is wanting to know if she can take 2 to see if it will help.   amitriptyline (ELAVIL) 25 MG tablet     [facility]/pharmacy #[id number] - [place], [place] - [address] [phone] [facility] [phone] [phone]      Please advise.

## 2021-08-10 ENCOUNTER — TRANSCRIBE ORDERS (OUTPATIENT)
Dept: PULMONOLOGY | Facility: HOSPITAL | Age: 36
End: 2021-08-10

## 2021-08-10 DIAGNOSIS — Z01.818 OTHER SPECIFIED PRE-OPERATIVE EXAMINATION: Primary | ICD-10-CM

## 2021-08-18 ENCOUNTER — TELEPHONE (OUTPATIENT)
Dept: CARDIOLOGY | Facility: CLINIC | Age: 36
End: 2021-08-18

## 2021-08-18 DIAGNOSIS — F41.9 ANXIETY: ICD-10-CM

## 2021-08-18 RX ORDER — ALPRAZOLAM 0.5 MG/1
0.5 TABLET ORAL 3 TIMES DAILY PRN
Qty: 30 TABLET | Refills: 0 | Status: CANCELLED | OUTPATIENT
Start: 2021-08-18

## 2021-08-18 RX ORDER — ALPRAZOLAM 0.5 MG/1
0.5 TABLET ORAL 3 TIMES DAILY PRN
Qty: 60 TABLET | Refills: 0 | Status: SHIPPED | OUTPATIENT
Start: 2021-08-18 | End: 2022-08-09 | Stop reason: SDUPTHER

## 2021-08-18 NOTE — TELEPHONE ENCOUNTER
Caller: Sarita Plata    Relationship: Self    Best call back number: [phone]     Medication needed:    ALPRAZolam (XANAX) 0.5 MG tablet     When do you need the refill by: 08/18/21    What additional details did the patient provide when requesting the medication: PATIENT IS CALLING TO STATE HER ANXIETY MEDICATION IS HELPING, BUT SHE STILL HAS EPISODES OF ANXIETY.  SHE IS WANTING TO KNOW IF [facility] WOULD REFILL THE ABOVE MEDICATION.    Does the patient have less than a 3 day supply:  [x] Yes  [] No    What is the patient's preferred pharmacy:      [facility]/pharmacy #[id number] - [place], [place] - [address] [phone] [facility] [phone]   [phone]    PLEASE ADVISE.

## 2021-08-18 NOTE — TELEPHONE ENCOUNTER
She has a 30 day event monitor on  She is having a reactions to the adhesive on the electrodes   Can she take it off until the cloth ones are delivered  tomorrow

## 2021-08-19 DIAGNOSIS — G47.00 INSOMNIA, UNSPECIFIED TYPE: ICD-10-CM

## 2021-08-20 RX ORDER — TRAZODONE HYDROCHLORIDE 50 MG/1
TABLET ORAL
Qty: 30 TABLET | Refills: 3 | OUTPATIENT
Start: 2021-08-20

## 2021-08-21 ENCOUNTER — LAB (OUTPATIENT)
Dept: LAB | Facility: HOSPITAL | Age: 36
End: 2021-08-21

## 2021-08-21 DIAGNOSIS — Z01.818 OTHER SPECIFIED PRE-OPERATIVE EXAMINATION: ICD-10-CM

## 2021-08-21 LAB — SARS-COV-2 ORF1AB RESP QL NAA+PROBE: NOT DETECTED

## 2021-08-21 PROCEDURE — C9803 HOPD COVID-19 SPEC COLLECT: HCPCS

## 2021-08-21 PROCEDURE — U0004 COV-19 TEST NON-CDC HGH THRU: HCPCS

## 2021-08-24 ENCOUNTER — HOSPITAL ENCOUNTER (OUTPATIENT)
Dept: RESPIRATORY THERAPY | Facility: HOSPITAL | Age: 36
Discharge: HOME | End: 2021-08-24
Admitting: NURSE PRACTITIONER

## 2021-08-24 DIAGNOSIS — R06.00 DYSPNEA, UNSPECIFIED TYPE: ICD-10-CM

## 2021-08-24 DIAGNOSIS — R07.9 INTERMITTENT CHEST PAIN: ICD-10-CM

## 2021-08-24 PROCEDURE — 94726 PLETHYSMOGRAPHY LUNG VOLUMES: CPT

## 2021-08-24 PROCEDURE — 94010 BREATHING CAPACITY TEST: CPT

## 2021-08-24 PROCEDURE — 94729 DIFFUSING CAPACITY: CPT

## 2021-08-28 DIAGNOSIS — G47.00 INSOMNIA, UNSPECIFIED TYPE: ICD-10-CM

## 2021-08-30 RX ORDER — AMITRIPTYLINE HYDROCHLORIDE 25 MG/1
TABLET, FILM COATED ORAL
Qty: 30 TABLET | Refills: 3 | Status: SHIPPED | OUTPATIENT
Start: 2021-08-30 | End: 2021-11-23

## 2021-09-03 ENCOUNTER — TELEPHONE (OUTPATIENT)
Dept: FAMILY MEDICINE CLINIC | Facility: CLINIC | Age: 36
End: 2021-09-03

## 2021-09-03 DIAGNOSIS — R06.00 DYSPNEA, UNSPECIFIED TYPE: Primary | ICD-10-CM

## 2021-09-03 NOTE — TELEPHONE ENCOUNTER
Please let patient know that in reviewing her recent PFT report, I believe there is a typo in the results and I am awaiting the pulmonologist to re-review the report and correct.   I will let her know once I have the finalized report.  (if she can see the current result in MyChart, that is the one that needs review).

## 2021-09-09 DIAGNOSIS — G47.00 INSOMNIA, UNSPECIFIED TYPE: ICD-10-CM

## 2021-09-09 RX ORDER — TRAZODONE HYDROCHLORIDE 50 MG/1
100 TABLET ORAL NIGHTLY
Qty: 60 TABLET | Refills: 3 | OUTPATIENT
Start: 2021-09-09

## 2021-09-10 LAB
Lab: 48
TOAL ENROLLMENT DAYS: 30

## 2021-09-10 PROCEDURE — 93272 ECG/REVIEW INTERPRET ONLY: CPT | Performed by: INTERNAL MEDICINE

## 2021-09-14 ENCOUNTER — OFFICE VISIT (OUTPATIENT)
Dept: CARDIOLOGY | Facility: CLINIC | Age: 36
End: 2021-09-14

## 2021-09-14 VITALS
SYSTOLIC BLOOD PRESSURE: 100 MMHG | BODY MASS INDEX: 29.16 KG/M2 | HEIGHT: 65 IN | HEART RATE: 109 BPM | DIASTOLIC BLOOD PRESSURE: 80 MMHG | WEIGHT: 175 LBS | RESPIRATION RATE: 18 BRPM

## 2021-09-14 DIAGNOSIS — R00.2 PALPITATIONS: Primary | ICD-10-CM

## 2021-09-14 PROCEDURE — 99204 OFFICE O/P NEW MOD 45 MIN: CPT | Performed by: INTERNAL MEDICINE

## 2021-09-14 RX ORDER — TRAZODONE HYDROCHLORIDE 50 MG/1
100 TABLET ORAL NIGHTLY
COMMUNITY
Start: 2021-08-08 | End: 2022-07-08

## 2021-09-14 NOTE — PROGRESS NOTES
Cardiology clinic note  Choco Pappas MD, PhD  Subjective:     Encounter Date:09/14/2021      Patient ID: Sarita Plata is a 36 y.o. female.    Chief Complaint:  Chief Complaint   Patient presents with   • Follow-up   New patient    Palpitations  Atypical chest pain    HPI:  History of Present Illness  At the pleasure to see this 36-year-old female is a new patient today.  She has a past cardiac history of atypical chest pain, palpitations and shortness of breath.  2D echo demonstrated structurally normal heart with EF 60 to 65%, no valvular heart dysfunction, normal diastolic function, essentially normal heart.  Holter monitor demonstrated occasional PACs and PVCs, triggered events associated with heart pounding and fluttering were sinus rhythm and sinus tachycardia essentially normal rhythms.  She is anxious about her overall cardiovascular health and that this is causing some unbeknownst problem.  Treadmill stress was ordered and performed today which demonstrated sinus rhythm increase to sinus tachycardia appropriately with normal heart rate and blood pressure response to stress.  There were no EKG changes consistent with any reversible ischemia.  There was no significant J-point or ST 80 depression.  No abnormal tachyarrhythmias, she walked for around 9 minutes achieving stage III at 10.1 METS.  This was a normal finding as well.  I tried to reassure the patient today that these are normal cardiovascular findings, there is no abnormal pathology with normal blood pressures.  It would appear that anxiety is driving a large percentage of some these higher heart rates.  Given blood pressures of 100 110 systolic and heart rates in the 80s to 90s today I would like to avoid starting her on metoprolol as I do not see significant burden of abnormal PACs or PVCs or something otherwise to treat in sinus tachycardia which will be well-tolerated at 36 years old with maximum theoretic heart rate of 1 80-1 90 with 220 -  age.  She is amenable for this approach.  We did discuss standard primary prevention goals with lipid control, diet and exercise per HI guidelines, diabetes prevention, maintaining non-smoking status.    Review of systems negative x14 point review of systems except as mentioned above  Historical data copied forward from previous encounters in EMR is unchanged      Exam  90s heart rate regular rate and rhythm no rubs gallops no heave no lift  1 out of 6 systolic ejection murmur lower sternal border very faint  No carotid bruits or JVD  Soft nontender nondistended  Clear to auscultation  No clubbing cyanosis or edema  Normal peripheral pulses  Neurologic grossly intact  Normocephalic/atraumatic pupils equal round  No carotid bruits or JVD on auscultation    Assessment plan per my encounter  Palpitations and sinus tachycardia  Benign findings on Holter  Normal 2D echo with structurally normal heart  Normal treadmill stress with no EKG changes, low risk study  Reassurance provided, underlying treatment of anxiety on Xanax Elavil and trazodone and sertraline at home  Advance diet and exercise, no restrictions  Primary prevention goals will continue  Follow-up primary care  Treat underlying anxiety likely source for driving tachycardia    Back to clinic in 1 year or as needed, primary care in the interim    Choco Pappas MD, PhD  The following portions of the patient's history were reviewed and updated as appropriate: allergies, current medications, past family history, past medical history, past social history, past surgical history and problem list.    Problem List:  Patient Active Problem List   Diagnosis   • Palpitations       Past Medical History:  Past Medical History:   Diagnosis Date   • Anxiety and depression        Past Surgical History:  Past Surgical History:   Procedure Laterality Date   • WISDOM TOOTH EXTRACTION         Social History:  Social History     Socioeconomic History   • Marital status: Single      "Spouse name: Not on file   • Number of children: Not on file   • Years of education: Not on file   • Highest education level: Not on file   Tobacco Use   • Smoking status: Never Smoker   • Smokeless tobacco: Never Used   Vaping Use   • Vaping Use: Never used   Substance and Sexual Activity   • Alcohol use: Not Currently     Comment: rare   • Drug use: Never   • Sexual activity: Defer       Allergies:  Allergies   Allergen Reactions   • Latex Rash       Immunizations:  Immunization History   Administered Date(s) Administered   • Influenza, Unspecified 10/05/2018       ROS:  ROS       Objective:         /80 (BP Location: Left arm, Patient Position: Sitting)   Pulse 109   Resp 18   Ht 165.1 cm (65\")   Wt 79.4 kg (175 lb)   BMI 29.12 kg/m²     Physical Exam    In-Office Procedure(s):  Procedures    ASCVD RIsk Score::  The ASCVD Risk score (Miriam PORRAS Jr., et al., 2013) failed to calculate for the following reasons:    The 2013 ASCVD risk score is only valid for ages 40 to 79    Recent Radiology:  Imaging Results (Most Recent)     None          Lab Review:   Lab on 08/21/2021   Component Date Value   • COVID19 08/21/2021 Not Detected    Hospital Outpatient Visit on 08/09/2021   Component Date Value   • Total Enrollment Days 08/09/2021 30    • Number of Transmissions 08/09/2021 48    Lab on 07/16/2021   Component Date Value   • C-Reactive Protein 07/16/2021 <0.30    • Glucose 07/16/2021 86    • BUN 07/16/2021 7    • Creatinine 07/16/2021 0.64    • Sodium 07/16/2021 137    • Potassium 07/16/2021 3.5    • Chloride 07/16/2021 102    • CO2 07/16/2021 27.0    • Calcium 07/16/2021 9.0    • Total Protein 07/16/2021 6.9    • Albumin 07/16/2021 4.40    • ALT (SGPT) 07/16/2021 5    • AST (SGOT) 07/16/2021 8    • Alkaline Phosphatase 07/16/2021 92    • Total Bilirubin 07/16/2021 0.2    • eGFR Non  Amer 07/16/2021 106    • Globulin 07/16/2021 2.5    • A/G Ratio 07/16/2021 1.8    • BUN/Creatinine Ratio 07/16/2021 10.9  "   • Anion Gap 07/16/2021 8.0    • TSH 07/16/2021 0.825    • Troponin T 07/16/2021 <0.010    • WBC 07/16/2021 8.90    • RBC 07/16/2021 4.47    • Hemoglobin 07/16/2021 12.2    • Hematocrit 07/16/2021 36.2    • MCV 07/16/2021 81.0    • MCH 07/16/2021 27.3    • MCHC 07/16/2021 33.7    • RDW 07/16/2021 13.8    • RDW-SD 07/16/2021 38.5    • MPV 07/16/2021 6.7    • Platelets 07/16/2021 406    • Neutrophil % 07/16/2021 64.0    • Lymphocyte % 07/16/2021 27.3    • Monocyte % 07/16/2021 7.4    • Eosinophil % 07/16/2021 0.5    • Basophil % 07/16/2021 0.8    • Neutrophils, Absolute 07/16/2021 5.70    • Lymphocytes, Absolute 07/16/2021 2.40    • Monocytes, Absolute 07/16/2021 0.70    • Eosinophils, Absolute 07/16/2021 0.00    • Basophils, Absolute 07/16/2021 0.10    • nRBC 07/16/2021 0.0    Hospital Outpatient Visit on 07/15/2021   Component Date Value   • BSA 07/15/2021 1.9    • RVIDd 07/15/2021 2.1    • IVSd 07/15/2021 0.85    • IVSs 07/15/2021 1.3    • LVIDd 07/15/2021 4.9    • LVIDs 07/15/2021 3.1    • LVPWd 07/15/2021 0.86    • BH CV ECHO SYBIL - LVPWS 07/15/2021 1.2    • IVS/LVPW 07/15/2021 0.99    • FS 07/15/2021 35.6    • EDV(Teich) 07/15/2021 112.0    • ESV(Teich) 07/15/2021 39.3    • EF(Teich) 07/15/2021 64.9    • EDV(cubed) 07/15/2021 116.6    • ESV(cubed) 07/15/2021 31.2    • EF(cubed) 07/15/2021 73.3    • % IVS thick 07/15/2021 47.5    • % LVPW thick 07/15/2021 44.9    • LV mass(C)d 07/15/2021 141.7    • LV mass(C)dI 07/15/2021 73.7    • LV mass(C)s 07/15/2021 123.8    • LV mass(C)sI 07/15/2021 64.4    • SV(Teich) 07/15/2021 72.7    • SI(Teich) 07/15/2021 37.8    • SV(cubed) 07/15/2021 85.4    • SI(cubed) 07/15/2021 44.4    • Ao root diam 07/15/2021 2.4    • Ao root area 07/15/2021 4.4    • ACS 07/15/2021 1.7    • LVOT diam 07/15/2021 2.0    • LVOT area 07/15/2021 3.3    • EDV(MOD-sp4) 07/15/2021 68.2    • ESV(MOD-sp4) 07/15/2021 22.1    • EF(MOD-sp4) 07/15/2021 67.6    • SV(MOD-sp4) 07/15/2021 46.1    • SI(MOD-sp4)  07/15/2021 24.0    • Ao root area (BSA correc* 07/15/2021 1.2    • LV Dudley Vol (BSA correct* 07/15/2021 35.5    • LV Sys Vol (BSA correcte* 07/15/2021 11.5    • MV E max rangel 07/15/2021 87.4    • MV A max rangel 07/15/2021 77.2    • MV E/A 07/15/2021 1.1    • MV V2 max 07/15/2021 90.1    • MV max PG 07/15/2021 3.2    • MV V2 mean 07/15/2021 55.1    • MV mean PG 07/15/2021 1.4    • MV V2 VTI 07/15/2021 18.5    • MVA(VTI) 07/15/2021 4.8    • MV dec slope 07/15/2021 423.3    • MV dec time 07/15/2021 0.21    • Ao pk rangel 07/15/2021 150.3    • Ao max PG 07/15/2021 9.0    • Ao max PG (full) 07/15/2021 2.3    • Ao V2 mean 07/15/2021 106.8    • Ao mean PG 07/15/2021 5.0    • Ao mean PG (full) 07/15/2021 1.2    • Ao V2 VTI 07/15/2021 31.4    • GAMALIEL(I,A) 07/15/2021 2.8    • GAMALIEL(I,D) 07/15/2021 2.8    • GAMALIEL(V,A) 07/15/2021 2.8    • GAMALIEL(V,D) 07/15/2021 2.8    • LV V1 max PG 07/15/2021 6.7    • LV V1 mean PG 07/15/2021 3.8    • LV V1 max 07/15/2021 129.5    • LV V1 mean 07/15/2021 92.9    • LV V1 VTI 07/15/2021 27.0    • SV(Ao) 07/15/2021 137.2    • SI(Ao) 07/15/2021 71.4    • SV(LVOT) 07/15/2021 88.7    • SI(LVOT) 07/15/2021 46.1    • PA V2 max 07/15/2021 106.8    • PA max PG 07/15/2021 4.6    • PA max PG (full) 07/15/2021 1.7    • PA V2 mean 07/15/2021 72.4    • PA mean PG 07/15/2021 2.3    • PA mean PG (full) 07/15/2021 0.87    • PA V2 VTI 07/15/2021 22.8    • PA acc time 07/15/2021 0.11    • RV V1 max PG 07/15/2021 2.9    • RV V1 mean PG 07/15/2021 1.5    • RV V1 max 07/15/2021 84.5    • RV V1 mean 07/15/2021 57.4    • RV V1 VTI 07/15/2021 17.9    • TR max rangel 07/15/2021 224.7    • RVSP(TR) 07/15/2021 23.3    • RAP systole 07/15/2021 3.0    • PA pr(Accel) 07/15/2021 29.2    • Pulm Sys Rangel 07/15/2021 54.8    • Pulm Dudely Rangel 07/15/2021 34.6    • Pulm S/D 07/15/2021 1.6    • Pulm A Revs Dur 07/15/2021 0.09    • Pulm A Revs Rangel 07/15/2021 29.8    • BH CV ECHO SYBIL - BZI_BMI 07/15/2021 31.1    • BH CV ECHO SYBIL - BSA(HA* 07/15/2021 2.0     • BH CV ECHO SYBIL - BZI_ME* 07/15/2021 84.8    • BH CV ECHO SYBIL - BZI_ME* 07/15/2021 165.1    • Target HR (85%) 07/15/2021 158    • Max. Pred. HR (100%) 07/15/2021 186    • EF(MOD-bp) 07/15/2021 68.0    • LA dimension(2D) 07/15/2021 2.9           Greater than 25-minute spent face-to-face with the patient as well as in significant time in counseling on benign nature of 2D echo which was reviewed interpreted by me as above, Holter monitor, stress testing today, mother present on exam.           Choco Pappas MD  09/14/21  .

## 2021-11-23 DIAGNOSIS — G47.00 INSOMNIA, UNSPECIFIED TYPE: ICD-10-CM

## 2021-11-23 RX ORDER — AMITRIPTYLINE HYDROCHLORIDE 25 MG/1
TABLET, FILM COATED ORAL
Qty: 90 TABLET | Refills: 1 | Status: SHIPPED | OUTPATIENT
Start: 2021-11-23 | End: 2022-06-06

## 2022-06-04 DIAGNOSIS — G47.00 INSOMNIA, UNSPECIFIED TYPE: ICD-10-CM

## 2022-06-06 RX ORDER — AMITRIPTYLINE HYDROCHLORIDE 25 MG/1
TABLET, FILM COATED ORAL
Qty: 90 TABLET | Refills: 1 | Status: SHIPPED | OUTPATIENT
Start: 2022-06-06 | End: 2022-12-05

## 2022-07-04 DIAGNOSIS — F41.9 ANXIETY: ICD-10-CM

## 2022-07-04 RX ORDER — SERTRALINE HYDROCHLORIDE 100 MG/1
TABLET, FILM COATED ORAL
Qty: 90 TABLET | Refills: 3 | Status: SHIPPED | OUTPATIENT
Start: 2022-07-04

## 2022-07-05 NOTE — PROGRESS NOTES
Subjective   Sarita Plata is a 37 y.o. female.       HPI   Pt here today for routine annual exam and medication follow up.   Medical/social/family hx reviewed.   Last pap ?  Stopped birth control two years ago and since has had heavy menstrual bleeding.    Anxiety - currently on sertraline 100 mg daily; amitriptyline 25 mg nightly to help with sleep.  Has PRN alprazolam.  Symptoms have remained stable.   Immunizations reviewed.     The following portions of the patient's history were reviewed and updated as appropriate: allergies, current medications, past family history, past medical history, past social history, past surgical history and problem list.    Review of Systems   Constitutional: Negative for chills, fatigue and fever.   Eyes: Negative for visual disturbance.   Respiratory: Negative for cough, chest tightness, shortness of breath and wheezing.    Cardiovascular: Negative for chest pain and palpitations.   Gastrointestinal: Negative for abdominal pain, blood in stool, constipation, diarrhea, nausea, vomiting and indigestion.   Endocrine: Negative for cold intolerance and heat intolerance.   Genitourinary: Negative for decreased urine volume, difficulty urinating, dysuria, flank pain, frequency, hematuria and urgency.   Musculoskeletal: Negative for arthralgias and myalgias.   Skin: Negative for rash.   Neurological: Negative for dizziness, weakness and headache.   Psychiatric/Behavioral: Negative for depressed mood. The patient is not nervous/anxious.        Objective   Physical Exam  Vitals reviewed.   Constitutional:       General: She is not in acute distress.     Appearance: Normal appearance.   HENT:      Head: Normocephalic and atraumatic.   Cardiovascular:      Rate and Rhythm: Normal rate and regular rhythm.      Pulses: Normal pulses.      Heart sounds: Normal heart sounds. No murmur heard.  Pulmonary:      Effort: Pulmonary effort is normal. No respiratory distress.      Breath sounds: Normal  breath sounds. No wheezing or rhonchi.   Chest:      Chest wall: No tenderness.   Abdominal:      Tenderness: There is no right CVA tenderness or left CVA tenderness.   Musculoskeletal:      Cervical back: Normal range of motion and neck supple. No tenderness.      Right lower leg: No edema.      Left lower leg: No edema.   Skin:     General: Skin is warm and dry.      Findings: No erythema.   Neurological:      General: No focal deficit present.      Mental Status: She is alert and oriented to person, place, and time.   Psychiatric:         Mood and Affect: Mood normal.           Assessment & Plan   Diagnoses and all orders for this visit:    1. Health maintenance examination (Primary)  Comments:  Medical/social/family hx reviewed.   Pap due; referral to GYN.   Immunizations reviewed.   Labs ordered.   Orders:  -     Comprehensive metabolic panel; Future  -     Lipid panel; Future  -     Hepatitis C antibody; Future  -     CBC w AUTO Differential; Future  -     TSH; Future    2. Anxiety  Comments:  Stable.  Cont. current medication.   RTO in 1 yr.     3. Menorrhagia with regular cycle  Comments:  Referral to GYN  Orders:  -     Ambulatory Referral to Gynecology      Overall well-being, sleep habits and weight goals, can be improved by maintaining healthy diet and regular physical activity. Recommended regular dental care, vision care, consistent seatbelt use, and skin protection.

## 2022-07-08 ENCOUNTER — LAB (OUTPATIENT)
Dept: FAMILY MEDICINE CLINIC | Facility: CLINIC | Age: 37
End: 2022-07-08

## 2022-07-08 ENCOUNTER — OFFICE VISIT (OUTPATIENT)
Dept: FAMILY MEDICINE CLINIC | Facility: CLINIC | Age: 37
End: 2022-07-08

## 2022-07-08 VITALS
BODY MASS INDEX: 32.99 KG/M2 | SYSTOLIC BLOOD PRESSURE: 115 MMHG | HEART RATE: 101 BPM | WEIGHT: 198 LBS | HEIGHT: 65 IN | DIASTOLIC BLOOD PRESSURE: 77 MMHG | OXYGEN SATURATION: 99 %

## 2022-07-08 DIAGNOSIS — N92.0 MENORRHAGIA WITH REGULAR CYCLE: ICD-10-CM

## 2022-07-08 DIAGNOSIS — F41.9 ANXIETY: ICD-10-CM

## 2022-07-08 DIAGNOSIS — Z00.00 HEALTH MAINTENANCE EXAMINATION: ICD-10-CM

## 2022-07-08 DIAGNOSIS — Z00.00 HEALTH MAINTENANCE EXAMINATION: Primary | ICD-10-CM

## 2022-07-08 LAB
ALBUMIN SERPL-MCNC: 4.8 G/DL (ref 3.5–5.2)
ALBUMIN/GLOB SERPL: 2.1 G/DL
ALP SERPL-CCNC: 89 U/L (ref 39–117)
ALT SERPL W P-5'-P-CCNC: 5 U/L (ref 1–33)
ANION GAP SERPL CALCULATED.3IONS-SCNC: 9.7 MMOL/L (ref 5–15)
AST SERPL-CCNC: 7 U/L (ref 1–32)
BILIRUB SERPL-MCNC: 0.3 MG/DL (ref 0–1.2)
BUN SERPL-MCNC: 10 MG/DL (ref 6–20)
BUN/CREAT SERPL: 13.9 (ref 7–25)
CALCIUM SPEC-SCNC: 8.9 MG/DL (ref 8.6–10.5)
CHLORIDE SERPL-SCNC: 103 MMOL/L (ref 98–107)
CHOLEST SERPL-MCNC: 157 MG/DL (ref 0–200)
CO2 SERPL-SCNC: 24.3 MMOL/L (ref 22–29)
CREAT SERPL-MCNC: 0.72 MG/DL (ref 0.57–1)
EGFRCR SERPLBLD CKD-EPI 2021: 112 ML/MIN/1.73
GLOBULIN UR ELPH-MCNC: 2.3 GM/DL
GLUCOSE SERPL-MCNC: 84 MG/DL (ref 65–99)
HCV AB SER DONR QL: NORMAL
HDLC SERPL-MCNC: 49 MG/DL (ref 40–60)
LDLC SERPL CALC-MCNC: 91 MG/DL (ref 0–100)
LDLC/HDLC SERPL: 1.84 {RATIO}
POTASSIUM SERPL-SCNC: 4.1 MMOL/L (ref 3.5–5.2)
PROT SERPL-MCNC: 7.1 G/DL (ref 6–8.5)
SODIUM SERPL-SCNC: 137 MMOL/L (ref 136–145)
TRIGL SERPL-MCNC: 89 MG/DL (ref 0–150)
TSH SERPL DL<=0.05 MIU/L-ACNC: 1.27 UIU/ML (ref 0.27–4.2)
VLDLC SERPL-MCNC: 17 MG/DL (ref 5–40)

## 2022-07-08 PROCEDURE — 80050 GENERAL HEALTH PANEL: CPT | Performed by: NURSE PRACTITIONER

## 2022-07-08 PROCEDURE — 80061 LIPID PANEL: CPT | Performed by: NURSE PRACTITIONER

## 2022-07-08 PROCEDURE — 36415 COLL VENOUS BLD VENIPUNCTURE: CPT

## 2022-07-08 PROCEDURE — 99395 PREV VISIT EST AGE 18-39: CPT | Performed by: NURSE PRACTITIONER

## 2022-07-08 PROCEDURE — 86803 HEPATITIS C AB TEST: CPT | Performed by: NURSE PRACTITIONER

## 2022-07-09 LAB
BASOPHILS # BLD AUTO: 0.07 10*3/MM3 (ref 0–0.2)
BASOPHILS NFR BLD AUTO: 0.9 % (ref 0–1.5)
DEPRECATED RDW RBC AUTO: 36.7 FL (ref 37–54)
EOSINOPHIL # BLD AUTO: 0.14 10*3/MM3 (ref 0–0.4)
EOSINOPHIL NFR BLD AUTO: 1.7 % (ref 0.3–6.2)
ERYTHROCYTE [DISTWIDTH] IN BLOOD BY AUTOMATED COUNT: 12.4 % (ref 12.3–15.4)
HCT VFR BLD AUTO: 34.8 % (ref 34–46.6)
HGB BLD-MCNC: 11.8 G/DL (ref 12–15.9)
IMM GRANULOCYTES # BLD AUTO: 0.02 10*3/MM3 (ref 0–0.05)
IMM GRANULOCYTES NFR BLD AUTO: 0.2 % (ref 0–0.5)
LYMPHOCYTES # BLD AUTO: 2.37 10*3/MM3 (ref 0.7–3.1)
LYMPHOCYTES NFR BLD AUTO: 29.2 % (ref 19.6–45.3)
MCH RBC QN AUTO: 28 PG (ref 26.6–33)
MCHC RBC AUTO-ENTMCNC: 33.9 G/DL (ref 31.5–35.7)
MCV RBC AUTO: 82.7 FL (ref 79–97)
MONOCYTES # BLD AUTO: 0.87 10*3/MM3 (ref 0.1–0.9)
MONOCYTES NFR BLD AUTO: 10.7 % (ref 5–12)
NEUTROPHILS NFR BLD AUTO: 4.64 10*3/MM3 (ref 1.7–7)
NEUTROPHILS NFR BLD AUTO: 57.3 % (ref 42.7–76)
NRBC BLD AUTO-RTO: 0 /100 WBC (ref 0–0.2)
PLATELET # BLD AUTO: 355 10*3/MM3 (ref 140–450)
PMV BLD AUTO: 9.3 FL (ref 6–12)
RBC # BLD AUTO: 4.21 10*6/MM3 (ref 3.77–5.28)
WBC NRBC COR # BLD: 8.11 10*3/MM3 (ref 3.4–10.8)

## 2022-08-09 DIAGNOSIS — F41.9 ANXIETY: ICD-10-CM

## 2022-08-09 RX ORDER — ALPRAZOLAM 0.5 MG/1
0.5 TABLET ORAL 3 TIMES DAILY PRN
Qty: 60 TABLET | Refills: 0 | Status: SHIPPED | OUTPATIENT
Start: 2022-08-09 | End: 2023-03-20 | Stop reason: SDUPTHER

## 2022-12-05 DIAGNOSIS — G47.00 INSOMNIA, UNSPECIFIED TYPE: ICD-10-CM

## 2022-12-05 RX ORDER — AMITRIPTYLINE HYDROCHLORIDE 25 MG/1
TABLET, FILM COATED ORAL
Qty: 90 TABLET | Refills: 1 | Status: SHIPPED | OUTPATIENT
Start: 2022-12-05

## 2023-03-20 DIAGNOSIS — F41.9 ANXIETY: ICD-10-CM

## 2023-03-20 RX ORDER — ALPRAZOLAM 0.5 MG/1
0.5 TABLET ORAL 3 TIMES DAILY PRN
Qty: 60 TABLET | Refills: 0 | Status: SHIPPED | OUTPATIENT
Start: 2023-03-20

## 2023-06-18 DIAGNOSIS — G47.00 INSOMNIA, UNSPECIFIED TYPE: ICD-10-CM

## 2023-06-19 RX ORDER — AMITRIPTYLINE HYDROCHLORIDE 25 MG/1
TABLET, FILM COATED ORAL
Qty: 90 TABLET | Refills: 1 | Status: SHIPPED | OUTPATIENT
Start: 2023-06-19

## 2023-07-24 DIAGNOSIS — D50.9 IRON DEFICIENCY ANEMIA, UNSPECIFIED IRON DEFICIENCY ANEMIA TYPE: Primary | ICD-10-CM

## 2023-08-16 DIAGNOSIS — D50.9 IRON DEFICIENCY ANEMIA, UNSPECIFIED IRON DEFICIENCY ANEMIA TYPE: ICD-10-CM

## 2023-08-16 RX ORDER — LANOLIN ALCOHOL/MO/W.PET/CERES
CREAM (GRAM) TOPICAL
Qty: 30 TABLET | Refills: 2 | Status: SHIPPED | OUTPATIENT
Start: 2023-08-16

## 2023-09-18 DIAGNOSIS — D50.9 IRON DEFICIENCY ANEMIA, UNSPECIFIED IRON DEFICIENCY ANEMIA TYPE: ICD-10-CM

## 2023-09-18 DIAGNOSIS — F41.9 ANXIETY: ICD-10-CM

## 2023-09-18 RX ORDER — ALPRAZOLAM 0.5 MG/1
0.5 TABLET ORAL 3 TIMES DAILY PRN
Qty: 60 TABLET | Refills: 0 | Status: SHIPPED | OUTPATIENT
Start: 2023-09-18

## 2023-09-18 RX ORDER — LANOLIN ALCOHOL/MO/W.PET/CERES
1 CREAM (GRAM) TOPICAL
Qty: 30 TABLET | Refills: 2 | Status: SHIPPED | OUTPATIENT
Start: 2023-09-18

## 2023-12-31 DIAGNOSIS — G47.00 INSOMNIA, UNSPECIFIED TYPE: ICD-10-CM

## 2024-01-01 RX ORDER — AMITRIPTYLINE HYDROCHLORIDE 25 MG/1
TABLET, FILM COATED ORAL
Qty: 90 TABLET | Refills: 1 | Status: SHIPPED | OUTPATIENT
Start: 2024-01-01

## 2024-04-23 DIAGNOSIS — F41.9 ANXIETY: ICD-10-CM

## 2024-04-23 DIAGNOSIS — G47.00 INSOMNIA, UNSPECIFIED TYPE: ICD-10-CM

## 2024-04-23 RX ORDER — ALPRAZOLAM 0.5 MG/1
0.5 TABLET ORAL 3 TIMES DAILY PRN
Qty: 60 TABLET | Refills: 0 | Status: SHIPPED | OUTPATIENT
Start: 2024-04-23

## 2024-04-23 RX ORDER — SERTRALINE HYDROCHLORIDE 100 MG/1
100 TABLET, FILM COATED ORAL DAILY
Qty: 90 TABLET | Refills: 3 | Status: SHIPPED | OUTPATIENT
Start: 2024-04-23

## 2024-04-23 RX ORDER — AMITRIPTYLINE HYDROCHLORIDE 25 MG/1
25 TABLET, FILM COATED ORAL
Qty: 90 TABLET | Refills: 1 | Status: SHIPPED | OUTPATIENT
Start: 2024-04-23

## 2024-07-26 NOTE — PROGRESS NOTES
Subjective   Sarita Plata is a 39 y.o. female.       HPI   Pt is here today for routine physical exam.   Medical/social/family hx reviewed.   Has moved to [place], [place] since last visit; new job as  an [employer].  Pt feels like the move has been very good for her.   Immunizations reviewed.   Last pap Oct 2023 with GYN.  LMP last week.  Wants to start back on oral contraceptives; she has been on this in the past. Will be seeing GYN in [place].    Anxiety - currently on sertraline 100 mg daily; alprazolam 0.5 mg tid. Moods stable; anxiety mch better since the move.   Insomnia - currently on amitriptyline 25 mg nightly. Stable.     The following portions of the patient's history were reviewed and updated as appropriate: allergies, current medications, past family history, past medical history, past social history, past surgical history, and problem list.    Review of Systems   Constitutional:  Negative for chills, fatigue and fever.   Respiratory:  Negative for cough, shortness of breath and wheezing.    Cardiovascular:  Negative for chest pain and palpitations.   Gastrointestinal:  Negative for abdominal pain, blood in stool, constipation, diarrhea, nausea, vomiting and GERD.   Genitourinary:  Positive for menstrual problem. Negative for dysuria, frequency, hematuria and urgency.   Neurological:  Negative for dizziness, weakness, light-headedness and headache.   Psychiatric/Behavioral:  Negative for depressed mood. The patient is not nervous/anxious.        Objective   Physical Exam  Vitals reviewed.   Constitutional:       General: She is not in acute distress.     Appearance: Normal appearance. She is obese.   HENT:      Head: Normocephalic and atraumatic.   Cardiovascular:      Rate and Rhythm: Normal rate and regular rhythm.      Pulses: Normal pulses.      Heart sounds: Normal heart sounds. No murmur heard.  Pulmonary:      Effort: Pulmonary effort is normal. No respiratory distress.      Breath sounds:  Normal breath sounds. No wheezing or rhonchi.   Chest:      Chest wall: No tenderness.   Abdominal:      General: Bowel sounds are normal. There is no distension.      Palpations: Abdomen is soft.      Tenderness: There is no abdominal tenderness. There is no right CVA tenderness or left CVA tenderness.   Musculoskeletal:      Cervical back: Normal range of motion and neck supple.   Skin:     General: Skin is warm and dry.      Findings: No erythema.   Neurological:      General: No focal deficit present.      Mental Status: She is alert and oriented to person, place, and time.   Psychiatric:         Mood and Affect: Mood normal.         Class 2 Severe Obesity (BMI >=35 and <=39.9). Obesity-related health conditions include the following: none. Obesity is unchanged. BMI is is above average; BMI management plan is completed. We discussed portion control and increasing exercise.       Procedures   Assessment & Plan   Diagnoses and all orders for this visit:    1. Health maintenance examination (Primary)  Comments:  Medical/social/family hx reviewed.   Immunizations reviewed.   Labs ordered.  Orders:  -     Comprehensive metabolic panel; Future  -     Lipid panel; Future  -     CBC w AUTO Differential; Future    2. Anxiety  Comments:  Stable  Cont. current medication.   Labs ordered.   RTO in 1 yr    3. Insomnia, unspecified type  Comments:  Stable  Cont. current medication.   Labs ordered.   RTO in 1 yr    4. Encounter for prescription of oral contraceptives  Comments:  Given Sprintec oral contraceptives.   Pt will be following up with GYN in [place].  Orders:  -     norgestimate-ethinyl estradiol (ORTHO TRI-CYCLEN,TRINESSA) 0.18/0.215/0.25 MG-35 MCG per tablet; Take 1 tablet by mouth Daily.  Dispense: 28 tablet; Refill: 12    5. Class 2 obesity due to excess calories without serious comorbidity with body mass index (BMI) of 37.0 to 37.9 in adult  Comments:  Work on a healthy diet; aim for 150 min exercise  weekly.

## 2024-08-02 ENCOUNTER — OFFICE VISIT (OUTPATIENT)
Dept: FAMILY MEDICINE CLINIC | Facility: CLINIC | Age: 39
End: 2024-08-02
Payer: COMMERCIAL

## 2024-08-02 ENCOUNTER — LAB (OUTPATIENT)
Dept: FAMILY MEDICINE CLINIC | Facility: CLINIC | Age: 39
End: 2024-08-02
Payer: COMMERCIAL

## 2024-08-02 VITALS
HEART RATE: 89 BPM | HEIGHT: 65 IN | BODY MASS INDEX: 37.82 KG/M2 | WEIGHT: 227 LBS | SYSTOLIC BLOOD PRESSURE: 112 MMHG | OXYGEN SATURATION: 98 % | DIASTOLIC BLOOD PRESSURE: 78 MMHG

## 2024-08-02 DIAGNOSIS — Z30.011 ENCOUNTER FOR PRESCRIPTION OF ORAL CONTRACEPTIVES: ICD-10-CM

## 2024-08-02 DIAGNOSIS — Z00.00 HEALTH MAINTENANCE EXAMINATION: Primary | ICD-10-CM

## 2024-08-02 DIAGNOSIS — E66.09 CLASS 2 OBESITY DUE TO EXCESS CALORIES WITHOUT SERIOUS COMORBIDITY WITH BODY MASS INDEX (BMI) OF 37.0 TO 37.9 IN ADULT: ICD-10-CM

## 2024-08-02 DIAGNOSIS — F41.9 ANXIETY: ICD-10-CM

## 2024-08-02 DIAGNOSIS — G47.00 INSOMNIA, UNSPECIFIED TYPE: ICD-10-CM

## 2024-08-02 DIAGNOSIS — Z00.00 HEALTH MAINTENANCE EXAMINATION: ICD-10-CM

## 2024-08-02 LAB
ALBUMIN SERPL-MCNC: 4.4 G/DL (ref 3.5–5.2)
ALBUMIN/GLOB SERPL: 1.6 G/DL
ALP SERPL-CCNC: 120 U/L (ref 39–117)
ALT SERPL W P-5'-P-CCNC: 8 U/L (ref 1–33)
ANION GAP SERPL CALCULATED.3IONS-SCNC: 11.1 MMOL/L (ref 5–15)
AST SERPL-CCNC: 12 U/L (ref 1–32)
BASOPHILS # BLD AUTO: 0.06 10*3/MM3 (ref 0–0.2)
BASOPHILS NFR BLD AUTO: 0.6 % (ref 0–1.5)
BILIRUB SERPL-MCNC: 0.2 MG/DL (ref 0–1.2)
BUN SERPL-MCNC: 10 MG/DL (ref 6–20)
BUN/CREAT SERPL: 12.8 (ref 7–25)
CALCIUM SPEC-SCNC: 9.2 MG/DL (ref 8.6–10.5)
CHLORIDE SERPL-SCNC: 105 MMOL/L (ref 98–107)
CHOLEST SERPL-MCNC: 161 MG/DL (ref 0–200)
CO2 SERPL-SCNC: 25.9 MMOL/L (ref 22–29)
CREAT SERPL-MCNC: 0.78 MG/DL (ref 0.57–1)
DEPRECATED RDW RBC AUTO: 42.4 FL (ref 37–54)
EGFRCR SERPLBLD CKD-EPI 2021: 100.5 ML/MIN/1.73
EOSINOPHIL # BLD AUTO: 0.11 10*3/MM3 (ref 0–0.4)
EOSINOPHIL NFR BLD AUTO: 1.1 % (ref 0.3–6.2)
ERYTHROCYTE [DISTWIDTH] IN BLOOD BY AUTOMATED COUNT: 14.9 % (ref 12.3–15.4)
GLOBULIN UR ELPH-MCNC: 2.7 GM/DL
GLUCOSE SERPL-MCNC: 106 MG/DL (ref 65–99)
HCT VFR BLD AUTO: 34 % (ref 34–46.6)
HDLC SERPL-MCNC: 49 MG/DL (ref 40–60)
HGB BLD-MCNC: 10.3 G/DL (ref 12–15.9)
IMM GRANULOCYTES # BLD AUTO: 0.06 10*3/MM3 (ref 0–0.05)
IMM GRANULOCYTES NFR BLD AUTO: 0.6 % (ref 0–0.5)
LDLC SERPL CALC-MCNC: 94 MG/DL (ref 0–100)
LDLC/HDLC SERPL: 1.89 {RATIO}
LYMPHOCYTES # BLD AUTO: 2.15 10*3/MM3 (ref 0.7–3.1)
LYMPHOCYTES NFR BLD AUTO: 21.3 % (ref 19.6–45.3)
MCH RBC QN AUTO: 23.7 PG (ref 26.6–33)
MCHC RBC AUTO-ENTMCNC: 30.3 G/DL (ref 31.5–35.7)
MCV RBC AUTO: 78.3 FL (ref 79–97)
MONOCYTES # BLD AUTO: 0.67 10*3/MM3 (ref 0.1–0.9)
MONOCYTES NFR BLD AUTO: 6.6 % (ref 5–12)
NEUTROPHILS NFR BLD AUTO: 69.8 % (ref 42.7–76)
NEUTROPHILS NFR BLD AUTO: 7.03 10*3/MM3 (ref 1.7–7)
NRBC BLD AUTO-RTO: 0 /100 WBC (ref 0–0.2)
PLATELET # BLD AUTO: 452 10*3/MM3 (ref 140–450)
PMV BLD AUTO: 8.7 FL (ref 6–12)
POTASSIUM SERPL-SCNC: 4 MMOL/L (ref 3.5–5.2)
PROT SERPL-MCNC: 7.1 G/DL (ref 6–8.5)
RBC # BLD AUTO: 4.34 10*6/MM3 (ref 3.77–5.28)
SODIUM SERPL-SCNC: 142 MMOL/L (ref 136–145)
TRIGL SERPL-MCNC: 97 MG/DL (ref 0–150)
VLDLC SERPL-MCNC: 18 MG/DL (ref 5–40)
WBC NRBC COR # BLD AUTO: 10.08 10*3/MM3 (ref 3.4–10.8)

## 2024-08-02 PROCEDURE — 99395 PREV VISIT EST AGE 18-39: CPT | Performed by: NURSE PRACTITIONER

## 2024-08-02 PROCEDURE — 80053 COMPREHEN METABOLIC PANEL: CPT | Performed by: NURSE PRACTITIONER

## 2024-08-02 PROCEDURE — 80061 LIPID PANEL: CPT | Performed by: NURSE PRACTITIONER

## 2024-08-02 PROCEDURE — 36415 COLL VENOUS BLD VENIPUNCTURE: CPT

## 2024-08-02 PROCEDURE — 85025 COMPLETE CBC W/AUTO DIFF WBC: CPT | Performed by: NURSE PRACTITIONER

## 2024-08-02 RX ORDER — NORGESTIMATE AND ETHINYL ESTRADIOL 7DAYSX3 28
1 KIT ORAL DAILY
Qty: 28 TABLET | Refills: 12 | Status: SHIPPED | OUTPATIENT
Start: 2024-08-02 | End: 2025-08-02

## 2024-10-01 DIAGNOSIS — F41.9 ANXIETY: ICD-10-CM

## 2024-10-01 RX ORDER — ALPRAZOLAM 0.5 MG
0.5 TABLET ORAL 3 TIMES DAILY PRN
Qty: 60 TABLET | Refills: 0 | Status: SHIPPED | OUTPATIENT
Start: 2024-10-01

## 2024-10-26 DIAGNOSIS — G47.00 INSOMNIA, UNSPECIFIED TYPE: ICD-10-CM

## 2025-02-10 DIAGNOSIS — F41.9 ANXIETY: ICD-10-CM

## 2025-02-10 RX ORDER — ALPRAZOLAM 0.5 MG
0.5 TABLET ORAL 3 TIMES DAILY PRN
Qty: 60 TABLET | Refills: 0 | Status: SHIPPED | OUTPATIENT
Start: 2025-02-10

## 2025-02-10 NOTE — TELEPHONE ENCOUNTER
Caller: Sarita Plata    Relationship: Self    Best call back number: [phone]    Requested Prescriptions:   Requested Prescriptions     Pending Prescriptions Disp Refills    ALPRAZolam (XANAX) 0.5 MG tablet 60 tablet 0     Sig: Take 1 tablet by mouth 3 (Three) Times a Day As Needed for Anxiety.        Pharmacy where request should be sent: [facility] #[id number] - [place], [place] - [address] [place] [address] & [facility] [phone] [facility] [phone] FX     Last office visit with prescribing clinician: 8/2/2024   Last telemedicine visit with prescribing clinician: Visit date not found   Next office visit with prescribing clinician: 8/8/2025     Additional details provided by patient: PATIENT HAS A COUPLE TABLETS LEFT    PLEASE SEE PATIENT MESSAGE OF ADDED STRESSES IN THE LAST FEW DAYS.    Does the patient have less than a 3 day supply:  [x] Yes  [] No    Would you like a call back once the refill request has been completed: [] Yes [x] No    If the office needs to give you a call back, can they leave a voicemail: [] Yes [x] No    Eulalio Alves Rep   02/10/25 08:40 EST

## 2025-05-03 DIAGNOSIS — G47.00 INSOMNIA, UNSPECIFIED TYPE: ICD-10-CM

## 2025-08-15 DIAGNOSIS — Z30.011 ENCOUNTER FOR PRESCRIPTION OF ORAL CONTRACEPTIVES: ICD-10-CM

## 2025-08-15 RX ORDER — NORGESTIMATE AND ETHINYL ESTRADIOL 7DAYSX3 28
1 KIT ORAL DAILY
Qty: 84 TABLET | Refills: 0 | Status: SHIPPED | OUTPATIENT
Start: 2025-08-15